# Patient Record
(demographics unavailable — no encounter records)

---

## 2025-06-23 NOTE — HISTORY OF PRESENT ILLNESS
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 CC annual physical   PMH: health anxiety, shares h/o concerns about her heart. SIBO on abx currently for a few more days, following with GI PSH: tonsillectomy c/b hemorrhage in 2020 Fam Hx: see chart Medications: neomycin and xifaxan currently for SIBO. 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 Monogamous Mood: consistent and good, has some depression and anxiety Firearms: N  #Health Maintenance Tdap: unsure of last tdap, will do today Gardasil: completed Pap: UTD, no h/o abn STI screen: not today Family Planning: copper IUD since October, periods have been heavier

## 2025-06-23 NOTE — ASSESSMENT
[Vaccines Reviewed] : Immunizations reviewed today. Please see immunization details in the vaccine log within the immunization flowsheet.  [FreeTextEntry1] : #HM Due for tdap UTD on pap, no h/o abn, follows with Gyn Completed gardasil Does not need STI screening today Non-fasting bloodwork today Has copper IUD - Bloodwork today - Tdap today - Get records - F/u 1 year, sooner if needed

## 2025-06-23 NOTE — PHYSICAL EXAM
[No Acute Distress] : no acute distress [Well-Appearing] : well-appearing [Coordination Grossly Intact] : coordination grossly intact [Normal Gait] : normal gait [Soft] : abdomen soft [Non Tender] : non-tender [Non-distended] : non-distended [Normal Bowel Sounds] : normal bowel sounds [Normal] : no posterior cervical lymphadenopathy and no anterior cervical lymphadenopathy